# Patient Record
Sex: MALE | Race: WHITE | NOT HISPANIC OR LATINO | URBAN - METROPOLITAN AREA
[De-identification: names, ages, dates, MRNs, and addresses within clinical notes are randomized per-mention and may not be internally consistent; named-entity substitution may affect disease eponyms.]

---

## 2022-07-28 NOTE — PATIENT DISCUSSION
Billed today's exam to medical. Mother aware of $55 refraction fee. Wants to save [employer] benefits for possible referral to Dr. Nadia Mosley.

## 2022-07-28 NOTE — PATIENT DISCUSSION
Patient is not bothered at this time. Recommend [place] if patient becomes bothered. Will continue to monitor.

## 2024-06-13 ENCOUNTER — CONSULTATION/EVALUATION (OUTPATIENT)
Facility: LOCATION | Age: 69
End: 2024-06-13

## 2024-06-13 DIAGNOSIS — H25.813: ICD-10-CM

## 2024-06-13 PROCEDURE — 92136 OPHTHALMIC BIOMETRY: CPT

## 2024-06-13 PROCEDURE — 99204 OFFICE O/P NEW MOD 45 MIN: CPT

## 2024-06-13 PROCEDURE — 92134 CPTRZ OPH DX IMG PST SGM RTA: CPT | Mod: NC

## 2024-06-13 ASSESSMENT — VISUAL ACUITY
OU_SC: 20/80-1
OS_SC: 20/400
OD_PH: 20/40-1
OS_BAT: 20/400
OU_SC: J1-1
OD_BAT: 20/400
OS_SC: J16
OS_AM: 20/60
OS_PH: 20/60-2
OD_SC: 20/50-2
OD_SC: J1-1

## 2024-06-13 ASSESSMENT — TONOMETRY
OD_IOP_MMHG: 17
OS_IOP_MMHG: 18

## 2024-07-24 ENCOUNTER — TECH ONLY (OUTPATIENT)
Facility: LOCATION | Age: 69
End: 2024-07-24

## 2024-07-24 ENCOUNTER — COMPREHENSIVE EXAM (OUTPATIENT)
Facility: LOCATION | Age: 69
End: 2024-07-24

## 2024-07-24 DIAGNOSIS — H25.813: ICD-10-CM

## 2024-07-24 PROCEDURE — 99211T TECH SERVICE: Mod: NC

## 2024-07-24 PROCEDURE — 99213 OFFICE O/P EST LOW 20 MIN: CPT | Mod: NC

## 2024-07-24 ASSESSMENT — VISUAL ACUITY
OD_PH: 20/40+2
OS_PH: 20/70
OS_SC: 20/100
OD_SC: 20/80-1

## 2024-07-24 ASSESSMENT — TONOMETRY
OD_IOP_MMHG: 19
OS_IOP_MMHG: 20

## 2024-09-04 ENCOUNTER — SURGERY/PROCEDURE (OUTPATIENT)
Facility: LOCATION | Age: 69
End: 2024-09-04

## 2024-09-04 DIAGNOSIS — H25.812: ICD-10-CM

## 2024-09-04 PROCEDURE — 6698454AV REMOVE CATARACT, INSERT ADVANCED LENS (SX ONLY): Mod: 54,LT

## 2024-09-05 ENCOUNTER — POST-OP (OUTPATIENT)
Facility: LOCATION | Age: 69
End: 2024-09-05

## 2024-09-05 DIAGNOSIS — Z96.1: ICD-10-CM

## 2024-11-20 ENCOUNTER — POST-OP (OUTPATIENT)
Facility: LOCATION | Age: 69
End: 2024-11-20

## 2024-11-20 DIAGNOSIS — H26.492: ICD-10-CM

## 2024-11-20 DIAGNOSIS — Z96.1: ICD-10-CM

## 2024-11-20 DIAGNOSIS — H25.811: ICD-10-CM

## 2024-11-20 PROCEDURE — 66821 AFTER CATARACT LASER SURGERY: CPT | Mod: 78,LT

## 2024-12-04 ENCOUNTER — POST-OP (OUTPATIENT)
Age: 69
End: 2024-12-04

## 2024-12-04 DIAGNOSIS — Z98.890: ICD-10-CM

## 2024-12-11 ENCOUNTER — POST-OP (OUTPATIENT)
Age: 69
End: 2024-12-11

## 2024-12-11 DIAGNOSIS — Z96.1: ICD-10-CM

## 2024-12-11 DIAGNOSIS — H52.12: ICD-10-CM

## 2025-02-12 ENCOUNTER — TECH ONLY (OUTPATIENT)
Age: 70
End: 2025-02-12

## 2025-02-12 DIAGNOSIS — Z96.1: ICD-10-CM

## 2025-02-12 DIAGNOSIS — H52.12: ICD-10-CM

## 2025-02-12 PROCEDURE — 99211T TECH SERVICE: Mod: NC

## 2025-02-25 ENCOUNTER — SURGERY/PROCEDURE (OUTPATIENT)
Age: 70
End: 2025-02-25

## 2025-02-25 DIAGNOSIS — H52.12: ICD-10-CM

## 2025-02-25 PROCEDURE — 66999PRK PRK

## 2025-03-04 ENCOUNTER — POST-OP (OUTPATIENT)
Age: 70
End: 2025-03-04

## 2025-03-04 DIAGNOSIS — Z98.890: ICD-10-CM

## 2025-03-04 PROCEDURE — 66999PO NON CO-MANAGED OTHER SURGERY PO

## 2025-03-24 ENCOUNTER — POST-OP (OUTPATIENT)
Age: 70
End: 2025-03-24

## 2025-03-24 DIAGNOSIS — Z98.890: ICD-10-CM

## 2025-03-24 DIAGNOSIS — Z96.1: ICD-10-CM

## 2025-03-24 PROCEDURE — 66999PO NON CO-MANAGED OTHER SURGERY PO

## 2025-04-21 ENCOUNTER — POST-OP (OUTPATIENT)
Age: 70
End: 2025-04-21

## 2025-04-21 DIAGNOSIS — Z98.890: ICD-10-CM

## 2025-04-21 PROCEDURE — 66999PO NON CO-MANAGED OTHER SURGERY PO

## 2025-06-02 ENCOUNTER — COMPREHENSIVE EXAM (OUTPATIENT)
Age: 70
End: 2025-06-02

## 2025-06-02 DIAGNOSIS — H25.811: ICD-10-CM

## 2025-06-02 DIAGNOSIS — Z98.890: ICD-10-CM

## 2025-06-02 DIAGNOSIS — Z96.1: ICD-10-CM

## 2025-06-02 PROCEDURE — 99213 OFFICE O/P EST LOW 20 MIN: CPT
